# Patient Record
Sex: MALE | Race: WHITE
[De-identification: names, ages, dates, MRNs, and addresses within clinical notes are randomized per-mention and may not be internally consistent; named-entity substitution may affect disease eponyms.]

---

## 2017-02-28 LAB
ANION GAP SERPL CALC-SCNC: 12 MMOL/L (ref 5–19)
BUN SERPL-MCNC: 14 MG/DL (ref 7–20)
CALCIUM: 9.5 MG/DL (ref 8.4–10.2)
CHLORIDE SERPL-SCNC: 99 MMOL/L (ref 98–107)
CO2 SERPL-SCNC: 27 MMOL/L (ref 22–30)
CREAT SERPL-MCNC: 0.99 MG/DL (ref 0.52–1.25)
ERYTHROCYTE [DISTWIDTH] IN BLOOD BY AUTOMATED COUNT: 12.8 % (ref 11.5–14)
GLUCOSE SERPL-MCNC: 284 MG/DL (ref 75–110)
HCT VFR BLD CALC: 43.8 % (ref 37.9–51)
HGB BLD-MCNC: 15.4 G/DL (ref 13.5–17)
HGB HCT DIFFERENCE: 2.4
MCH RBC QN AUTO: 32.2 PG (ref 27–33.4)
MCHC RBC AUTO-ENTMCNC: 35.3 G/DL (ref 32–36)
MCV RBC AUTO: 91 FL (ref 80–97)
POTASSIUM SERPL-SCNC: 5.1 MMOL/L (ref 3.6–5)
RBC # BLD AUTO: 4.8 10^6/UL (ref 4.35–5.55)
SODIUM SERPL-SCNC: 138 MMOL/L (ref 137–145)
WBC # BLD AUTO: 6 10^3/UL (ref 4–10.5)

## 2017-03-07 ENCOUNTER — HOSPITAL ENCOUNTER (OUTPATIENT)
Dept: HOSPITAL 62 - OROUT | Age: 52
Discharge: HOME | End: 2017-03-07
Attending: SURGERY
Payer: COMMERCIAL

## 2017-03-07 VITALS — DIASTOLIC BLOOD PRESSURE: 65 MMHG | SYSTOLIC BLOOD PRESSURE: 115 MMHG

## 2017-03-07 DIAGNOSIS — G47.33: ICD-10-CM

## 2017-03-07 DIAGNOSIS — M19.90: ICD-10-CM

## 2017-03-07 DIAGNOSIS — K42.0: Primary | ICD-10-CM

## 2017-03-07 PROCEDURE — 94640 AIRWAY INHALATION TREATMENT: CPT

## 2017-03-07 PROCEDURE — 85027 COMPLETE CBC AUTOMATED: CPT

## 2017-03-07 PROCEDURE — 82962 GLUCOSE BLOOD TEST: CPT

## 2017-03-07 PROCEDURE — 36415 COLL VENOUS BLD VENIPUNCTURE: CPT

## 2017-03-07 PROCEDURE — 49587: CPT

## 2017-03-07 PROCEDURE — 0WUF0JZ SUPPLEMENT ABDOMINAL WALL WITH SYNTHETIC SUBSTITUTE, OPEN APPROACH: ICD-10-PCS | Performed by: SURGERY

## 2017-03-07 PROCEDURE — 84132 ASSAY OF SERUM POTASSIUM: CPT

## 2017-03-07 PROCEDURE — 71020: CPT

## 2017-03-07 PROCEDURE — 80048 BASIC METABOLIC PNL TOTAL CA: CPT

## 2017-03-07 NOTE — OPERATIVE REPORT
Operative Report


DATE OF SURGERY: 03/07/17


PREOPERATIVE DIAGNOSIS: #1 symptomatic incarcerated umbilical hernia.  #2 

arthritis.


POSTOPERATIVE DIAGNOSIS: #1 symptomatic incarcerated umbilical hernia.  #2 

arthritis.


OPERATION: Reduction and repair with mesh of umbilical hernia.  Open.


SURGEON: LENNOX WILLIAMS 1ST ASSISTANT: ARASH NAPIER


ANESTHESIA: GA


TISSUE REMOVED OR ALTERED: Not applicable.


COMPLICATIONS: 


None


ESTIMATED BLOOD LOSS: 10 mL.


INTRAOPERATIVE FINDINGS: Of a incarcerated umbilical hernia.  Contents being 

preperitoneal fat.  The defect measured about 1.5 cm.  Nicely reduced with a 

satisfactory space in the preperitoneal space.  Reduction well-maintained with 

mesh and the fascial closure.


PROCEDURE: 


After obtaining informed consent and going over the procedure with [the patient 

and his family], he was taken to the operating room, he was anesthetized and 

intubated.  The abdomen was prepped and draped in the usual sterile fashion. 

After the universal timeout, in which it was verified that the patient received 

IV antibiotic, the procedure commenced.





A midline incision was made, curvilinear around the umbilicus about 5 cm in 

length.  This is in reference to the palpation and marking of the abdominal 

wall bulge.  Local anesthesia was infiltrated.  Incision was made with a [15 

blade scalpel].  Dissection now proceeded through the subcutaneous tissue down 

to the hernia sac and umbilical remnant.  The umbilical remnant was transected 

about half a centimeter deep to the skin.  The margins of the hernia were 

defined dissecting with cautery and scissor dissection as needed.  The fascial 

margins were serially grasped with Kocher clamps and elevated.  This 

facilitated [retroperitoneal dissection].  This was done circumferentially for 

at 4 cm, in all directions].  This was facilitated by by moving from each side 

of the table so as to get optimal access to the other.  Hemostasis was now 

secured in this space using monopolar cautery.  





The dimensions of the fascial defect were measured out.  It was enlarged 

somewhat to accommodate the mesh.  A mesh was selected of the size, sufficient 

to cover the hernia defect with at least a 3 cm overlap.  The mesh was now 

deployed flat in the preperitoneal space.  It was anchored and kept slightly 

taut.  In  this way circumferential control of the mesh was accomplished.  The 

wound was irrigated with antibiotic containing solution.  .  Fascial closure,  

was now accomplished using a continuous suture of 0 PDS.  This was done with 

bites 5 mm apart and 5 mm away from the fascial edge.  This conforms to best 

practices for reduction of hernia recurrence.  The subcutaneous tissues under 

it when it were now irrigated with antibiotic containing solution.  The 

subcutaneous tissues were closed using layers of interrupted 3-0 PDS sutures.  

The skin was closed using a continuous suture of 4-0 Monocryl.  This was 

reinforced with Steri-Strips over benzoin and a sterile dressing applied.  





Copies dictated operative report to Dr. Lennox Williams MD

## 2017-03-07 NOTE — PDOC DISCHARGE SUMMARY
Discharge Summary (SDC)





- Discharge


Final Diagnosis: 


#1 incarcerated umbilical hernia symptomatic.





#2 arthritis.


Date of Surgery: 03/07/17


Discharge Date: 03/07/17


Condition: Good


Treatment or Instructions: 


#1 activities within moderation encouraged.  Up to the level of walking.





#2 follow up in my office by appointment in about 1 week.  Call for appointment.





#3 the wounds covered clean and dry until office visit.





#4 hold off on school/work until evaluation in office.





#5 may shower in 48 hours, keep operated area as dry as possible.





#6 discharge from ambulatory  when ASU criteria met.





#7 medications per medication reconciliation sheet.





#8 Percocet and Toradol by prescription.. Also may have one Percocet up to 

every 2 hours  when necessary for pain greater than 4 out of 10 while in the ASU





#9 where abdominal binder as much as possible.  Aim to wear it for at least a 

month.


Prescriptions: 


Ketorolac Tromethamine [Toradol 10 mg Tablet] 10 mg PO Q8HP PRN #0 tablet


 PRN Reason: 


Oxycodone HCl/Acetaminophen [Percocet 5-325 mg Tablet] 1 tab PO ASDIR PRN #15 

tab


 PRN Reason: 


Discharge Diet: As Tolerated


Respiratory Treatments at Home: Deep Breathing/Coughing


Discharge Activity: Other - Up to the level of walking encouraged.


Report the Following to Your Physician Immediately: Increase in Pain, Unusual 

Bleeding

## 2018-01-30 ENCOUNTER — HOSPITAL ENCOUNTER (EMERGENCY)
Dept: HOSPITAL 62 - ER | Age: 53
Discharge: HOME | End: 2018-01-30
Payer: COMMERCIAL

## 2018-01-30 VITALS — SYSTOLIC BLOOD PRESSURE: 144 MMHG | DIASTOLIC BLOOD PRESSURE: 98 MMHG

## 2018-01-30 DIAGNOSIS — R06.02: ICD-10-CM

## 2018-01-30 DIAGNOSIS — R05: ICD-10-CM

## 2018-01-30 DIAGNOSIS — R07.89: Primary | ICD-10-CM

## 2018-01-30 DIAGNOSIS — F17.290: ICD-10-CM

## 2018-01-30 LAB
ADD MANUAL DIFF: NO
ANION GAP SERPL CALC-SCNC: 12 MMOL/L (ref 5–19)
BASOPHILS # BLD AUTO: 0.1 10^3/UL (ref 0–0.2)
BASOPHILS NFR BLD AUTO: 1.1 % (ref 0–2)
BUN SERPL-MCNC: 11 MG/DL (ref 7–20)
CALCIUM: 9.8 MG/DL (ref 8.4–10.2)
CHLORIDE SERPL-SCNC: 104 MMOL/L (ref 98–107)
CO2 SERPL-SCNC: 27 MMOL/L (ref 22–30)
EOSINOPHIL # BLD AUTO: 0.3 10^3/UL (ref 0–0.6)
EOSINOPHIL NFR BLD AUTO: 3.7 % (ref 0–6)
ERYTHROCYTE [DISTWIDTH] IN BLOOD BY AUTOMATED COUNT: 12.5 % (ref 11.5–14)
GLUCOSE SERPL-MCNC: 101 MG/DL (ref 75–110)
HCT VFR BLD CALC: 42.4 % (ref 37.9–51)
HGB BLD-MCNC: 15.3 G/DL (ref 13.5–17)
LYMPHOCYTES # BLD AUTO: 2 10^3/UL (ref 0.5–4.7)
LYMPHOCYTES NFR BLD AUTO: 28.9 % (ref 13–45)
MCH RBC QN AUTO: 33 PG (ref 27–33.4)
MCHC RBC AUTO-ENTMCNC: 36.2 G/DL (ref 32–36)
MCV RBC AUTO: 91 FL (ref 80–97)
MONOCYTES # BLD AUTO: 0.7 10^3/UL (ref 0.1–1.4)
MONOCYTES NFR BLD AUTO: 9.8 % (ref 3–13)
NEUTROPHILS # BLD AUTO: 4 10^3/UL (ref 1.7–8.2)
NEUTS SEG NFR BLD AUTO: 56.5 % (ref 42–78)
PLATELET # BLD: 202 10^3/UL (ref 150–450)
POTASSIUM SERPL-SCNC: 4.2 MMOL/L (ref 3.6–5)
RBC # BLD AUTO: 4.64 10^6/UL (ref 4.35–5.55)
SODIUM SERPL-SCNC: 142.7 MMOL/L (ref 137–145)
TOTAL CELLS COUNTED % (AUTO): 100 %
WBC # BLD AUTO: 7 10^3/UL (ref 4–10.5)

## 2018-01-30 PROCEDURE — 71045 X-RAY EXAM CHEST 1 VIEW: CPT

## 2018-01-30 PROCEDURE — 36415 COLL VENOUS BLD VENIPUNCTURE: CPT

## 2018-01-30 PROCEDURE — 85025 COMPLETE CBC W/AUTO DIFF WBC: CPT

## 2018-01-30 PROCEDURE — 84484 ASSAY OF TROPONIN QUANT: CPT

## 2018-01-30 PROCEDURE — 93005 ELECTROCARDIOGRAM TRACING: CPT

## 2018-01-30 PROCEDURE — 93010 ELECTROCARDIOGRAM REPORT: CPT

## 2018-01-30 PROCEDURE — 99285 EMERGENCY DEPT VISIT HI MDM: CPT

## 2018-01-30 PROCEDURE — 80048 BASIC METABOLIC PNL TOTAL CA: CPT

## 2018-01-30 NOTE — RADIOLOGY REPORT (SQ)
EXAM DESCRIPTION:  CHEST SINGLE VIEW



COMPLETED DATE/TIME:  1/30/2018 8:04 am



REASON FOR STUDY:  CP



COMPARISON:  2/28/2017.



EXAM PARAMETERS:  NUMBER OF VIEWS: One view.

TECHNIQUE: Single frontal radiographic view of the chest acquired.

RADIATION DOSE: NA

LIMITATIONS: None.



FINDINGS:  LUNGS AND PLEURA: No opacities, masses or pneumothorax. No pleural effusion.

MEDIASTINUM AND HILAR STRUCTURES: No masses.  Contour normal.

HEART AND VASCULAR STRUCTURES: Heart normal in size.  Normal vasculature.

BONES: Dorsal spondylosis noted.  .

HARDWARE: None in the chest.

OTHER: Chest leads in place.



IMPRESSION:  NO ACUTE DISEASE.



TECHNICAL DOCUMENTATION:  JOB ID:  [id number]

[id number]

 2011 [employer]- All Rights Reserved

## 2018-01-30 NOTE — ER DOCUMENT REPORT
ED General





- General


Chief Complaint: Chest Pain


Stated Complaint: LEFT SIDE PAIN


Time Seen by Provider: 01/30/18 07:35


Notes: 





52-year-old male presents with chest pain right-sided supraclavicular, this 

began yesterday in the setting of coughing congestion and body aches.  He was 

diagnosed with fluid given Robitussin and "an antibiotic."  Yesterday.  He 

developed pain on the right supraclavicular region after coughing which was 

worse with deep breath and in certain positions.  In the middle the night he 

woke up and not pain moves across his chest over his sternum to his left side 

down his arm.  This lasted about 30 minutes and resolve spontaneously.  He has 

never had this before.  It is not exertional.  He denies sweating or shortness 

of breath.  He has no history of cardiac disease.  He does vapor tobacco.


TRAVEL OUTSIDE OF THE [place] IN LAST 30 DAYS: No





- Related Data


Allergies/Adverse Reactions: 


 





No Known Allergies Allergy (Verified 01/30/18 08:55)


 











Past Medical History





- General


Information source: Patient





- Social History


Smoking Status: Current Every Day Smoker


Smoking Education Provided: Yes - The patient ED visit today was directly 

related to their abuse of tobacco. 


Family History: Reviewed & Not Pertinent





- Past Medical History


Cardiac Medical History: 


   Denies: Hx Coronary Artery Disease, Hx Heart Attack, Hx Hypertension


Pulmonary Medical History: Reports: Hx Asthma - AS A CHILD-no meds


   Denies: Hx Bronchitis, Hx COPD, Hx Pneumonia


Neurological Medical History: Denies: Hx Cerebrovascular Accident, Hx Seizures


Musculoskeltal Medical History: Reports Hx Arthritis - hands,ankles


Past Surgical History: Reports: Hx Cholecystectomy, Hx Tonsillectomy





- Immunizations


Hx Diphtheria, Pertussis, Tetanus Vaccination: Yes





Review of Systems





- Review of Systems


Notes: 





REVIEW OF SYSTEMS





GEN: Denies fever, chills, weight loss


ENT: Denies sore throat, nasal discharge, ear pain


EYES: Denies blurry vision, eye pain, discharge


CV: Resolved chest pain


RESP: D cough and shortness of breath


GI: Denies abdominal pain, nausea, vomiting, diarrhea


MSK: Denies joint pain/swelling, edema, 


SKIN: Denies rash, skin lesions


LYMPH: Denies swollen glands/lymph nodes


NEURO: Denies headache, focal weakness or numbness, dizziness


PSYCH: Denies depression, suicidal or homicidal ideation








PHYSICAL EXAMINATION





General: No acute distress, well-nourished


Head: Atraumatic, normocephalic


ENT: Mouth normal, oropharynx moist, no exudates or tonsillar enlargement


Eyes: Conjunctiva normal, pupils equal, lids normal


Neck: No JVD, supple, no guarding


CVS: Normal rate, regular rhythm, no murmurs.  Right chest tenderness below the 

clavicle


Resp: No resp distress, equal and normal breath sounds bilaterally


GI: Nondistended, soft, no tenderness to palpation, no rebound or guarding


Ext: No deformities, no edema, normal range of motion in upper and lower ext


Back: No CVA or midline TTP


Skin: No rash, warm


Lymphatic: No lymphadeopathy noted


Neuro: Awake, alert.  Face symmetric.  GCS 15.





Physical Exam





- Vital signs


Vitals: 


 











Temp Pulse Resp BP Pulse Ox


 


 97.8 F   67   16   152/89 H  96 


 


 01/30/18 07:36  01/30/18 07:36  01/30/18 07:36  01/30/18 07:36  01/30/18 07:36














Course





- Re-evaluation


Re-evalutation: 





01/30/18 07:58


This is a 52-year-old male with no cardiac disease history presenting with 

right chest pain radiating across to his left arm now resolved all in the 

setting of body aches and respiratory symptoms.  This likely reflects pleurisy/

chest wall irritation from coughing rather than acute coronary syndrome given 

his story.


He does use tobacco is a risk factor for coronary disease.  His ECG is very 

normal on my examination.  He is chest pain is reproducible.  Despite that I 

will get a single troponin to rule him out, chest x-ray to rule out pneumonia 

and basic laboratory testing.  He is already on antitussives and antibiotics.


01/30/18 09:42


GI negative, EKG and troponin negative.  Likely pleurisy, no evidence of 

pulmonary illness.


Discharge is stable condition to follow-up with primary care.


I have discussed with the patient there likely diagnosis, aftercare plan, follow

-up plans and my usual and customary return precautions.  They verbalized 

understanding of this.





- Vital Signs


Vital signs: 


 











Temp Pulse Resp BP Pulse Ox


 


 97.8 F   67   17   144/98 H  98 


 


 01/30/18 07:36  01/30/18 07:36  01/30/18 09:01  01/30/18 09:01  01/30/18 09:01














- Laboratory


Result Diagrams: 


 01/30/18 08:25





 01/30/18 08:25


Laboratory results interpreted by me: 


 











  01/30/18





  08:25


 


[facility]  36.2 H














Discharge





- Discharge


Clinical Impression: 


 Chest wall pain





Condition: Good


Disposition: HOME, SELF-CARE


Instructions:  Chest Pain of Unclear Cause ([facility])


Additional Instructions: 


Please continue taking the 2 medications you have been prescribed for your 

respiratory infection.  Please see your primary care doctor in 2-3 days for a 

follow-up.  If your pain gets worse you get any nausea with the pain or 

shortness of breath with the pain or are otherwise concerned, please return to 

the emergency room for further evaluation.


Referrals: 


MELANI TEJEDA MD [Primary Care Provider] - Follow up as needed

## 2018-07-29 ENCOUNTER — HOSPITAL ENCOUNTER (EMERGENCY)
Dept: HOSPITAL 62 - ER | Age: 53
Discharge: HOME | End: 2018-07-29
Payer: COMMERCIAL

## 2018-07-29 VITALS — DIASTOLIC BLOOD PRESSURE: 83 MMHG | SYSTOLIC BLOOD PRESSURE: 138 MMHG

## 2018-07-29 DIAGNOSIS — E11.9: ICD-10-CM
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
## 2018-07-29 DIAGNOSIS — F07.81: ICD-10-CM

## 2018-07-29 DIAGNOSIS — S09.90XA: Primary | ICD-10-CM

## 2018-07-29 DIAGNOSIS — J45.909: ICD-10-CM

## 2018-07-29 LAB
ADD MANUAL DIFF: NO
ALBUMIN SERPL-MCNC: 3.2 G/DL (ref 3.5–5)
ALP SERPL-CCNC: 90 U/L (ref 38–126)
ALT SERPL-CCNC: 48 U/L (ref 21–72)
ANION GAP SERPL CALC-SCNC: 13 MMOL/L (ref 5–19)
AST SERPL-CCNC: 59 U/L (ref 17–59)
BASOPHILS # BLD AUTO: 0.1 10^3/UL (ref 0–0.2)
BASOPHILS NFR BLD AUTO: 0.9 % (ref 0–2)
BILIRUB DIRECT SERPL-MCNC: 0.3 MG/DL (ref 0–0.4)
BILIRUB SERPL-MCNC: 1.3 MG/DL (ref 0.2–1.3)
BUN SERPL-MCNC: 15 MG/DL (ref 7–20)
CALCIUM: 8.6 MG/DL (ref 8.4–10.2)
CHLORIDE SERPL-SCNC: 105 MMOL/L (ref 98–107)
CO2 SERPL-SCNC: 26 MMOL/L (ref 22–30)
EOSINOPHIL # BLD AUTO: 0.4 10^3/UL (ref 0–0.6)
EOSINOPHIL NFR BLD AUTO: 5.5 % (ref 0–6)
ERYTHROCYTE [DISTWIDTH] IN BLOOD BY AUTOMATED COUNT: 12.8 % (ref 11.5–14)
GLUCOSE SERPL-MCNC: 156 MG/DL (ref 75–110)
HCT VFR BLD CALC: 24 % (ref 37.9–51)
HGB BLD-MCNC: 8.5 G/DL (ref 13.5–17)
LYMPHOCYTES # BLD AUTO: 1.7 10^3/UL (ref 0.5–4.7)
LYMPHOCYTES NFR BLD AUTO: 22.3 % (ref 13–45)
MCH RBC QN AUTO: 32.7 PG (ref 27–33.4)
MCHC RBC AUTO-ENTMCNC: 35.2 G/DL (ref 32–36)
MCV RBC AUTO: 93 FL (ref 80–97)
MONOCYTES # BLD AUTO: 0.7 10^3/UL (ref 0.1–1.4)
MONOCYTES NFR BLD AUTO: 9.3 % (ref 3–13)
NEUTROPHILS # BLD AUTO: 4.8 10^3/UL (ref 1.7–8.2)
NEUTS SEG NFR BLD AUTO: 62 % (ref 42–78)
PLATELET # BLD: 356 10^3/UL (ref 150–450)
POTASSIUM SERPL-SCNC: 3.9 MMOL/L (ref 3.6–5)
PROT SERPL-MCNC: 5.9 G/DL (ref 6.3–8.2)
RBC # BLD AUTO: 2.59 10^6/UL (ref 4.35–5.55)
SODIUM SERPL-SCNC: 143.7 MMOL/L (ref 137–145)
TOTAL CELLS COUNTED % (AUTO): 100 %
WBC # BLD AUTO: 7.8 10^3/UL (ref 4–10.5)

## 2018-07-29 PROCEDURE — 36415 COLL VENOUS BLD VENIPUNCTURE: CPT

## 2018-07-29 PROCEDURE — 99284 EMERGENCY DEPT VISIT MOD MDM: CPT

## 2018-07-29 PROCEDURE — 70450 CT HEAD/BRAIN W/O DYE: CPT

## 2018-07-29 PROCEDURE — 80053 COMPREHEN METABOLIC PANEL: CPT

## 2018-07-29 PROCEDURE — 85025 COMPLETE CBC W/AUTO DIFF WBC: CPT

## 2018-07-29 PROCEDURE — 70551 MRI BRAIN STEM W/O DYE: CPT
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
## 2018-07-29 NOTE — RADIOLOGY REPORT (SQ)
EXAM DESCRIPTION:  CT HEAD WITHOUT



COMPLETED DATE/TIME:  7/29/2018 4:27 pm



REASON FOR STUDY:  dysarthria



COMPARISON:  None.



TECHNIQUE:  Axial images acquired through the brain without intravenous contrast.  Images reviewed wi
th bone, brain and subdural windows.  Additional sagittal and coronal reconstructions were generated.
 Images stored on PACS.

All CT scanners at this facility use dose modulation, iterative reconstruction, and/or weight based d
osing when appropriate to reduce radiation dose to as low as reasonably achievable (ALARA).

[facility]: Dose Right  [facility]: CareDose    [facility]: Dose Right    [facility]: Teradose 4D    [facility]: Smart [employer] Software



RADIATION DOSE:  CT Rad equipment meets quality standard of care and radiation dose reduction techniq
ues were employed. CTDIvol: 53.2 mGy. DLP: 991 mGy-cm. mGy.



LIMITATIONS:  None.



FINDINGS:  VENTRICLES: Normal size and contour.

CEREBRUM: No masses.  No hemorrhage.  No midline shift.  No evidence for acute infarction. Normal gra
y/white matter differentiation. No areas of low density in the white matter.

CEREBELLUM: No masses.  No hemorrhage.  No alteration of density.  No evidence for acute infarction.

EXTRAAXIAL SPACES: No fluid collections.  No masses.

ORBITS AND GLOBE: No intra- or extraconal masses.  Normal contour of globe without masses.

CALVARIUM: No fracture.

PARANASAL SINUSES: No fluid or mucosal thickening.

SOFT TISSUES: No mass or hematoma.

OTHER: No other significant finding.



IMPRESSION:  NORMAL BRAIN CT WITHOUT CONTRAST.

EVIDENCE OF ACUTE STROKE: NO.



COMMENT:  Quality ID # 436: Final reports with documentation of one or more dose reduction techniques
 (e.g., Automated exposure control, adjustment of the mA and/or kV according to patient size, use of 
iterative reconstruction technique)



TECHNICAL DOCUMENTATION:  JOB ID:  [id number]

 2011 [employer]- All Rights Reserved



Reading location - IP/workstation name: [email or web address]
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
## 2020-10-22 ENCOUNTER — HOSPITAL ENCOUNTER (EMERGENCY)
Dept: HOSPITAL 62 - ER | Age: 55
Discharge: HOME | End: 2020-10-22
Payer: COMMERCIAL

## 2020-10-22 VITALS — SYSTOLIC BLOOD PRESSURE: 147 MMHG | DIASTOLIC BLOOD PRESSURE: 91 MMHG

## 2020-10-22 DIAGNOSIS — L03.113: ICD-10-CM

## 2020-10-22 DIAGNOSIS — E11.69: ICD-10-CM

## 2020-10-22 DIAGNOSIS — Z20.3: ICD-10-CM
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
## 2020-10-22 DIAGNOSIS — Z23: ICD-10-CM

## 2020-10-22 DIAGNOSIS — S61.451A: Primary | ICD-10-CM

## 2020-10-22 LAB
ADD MANUAL DIFF: NO
ALBUMIN SERPL-MCNC: 4.6 G/DL (ref 3.5–5)
ALP SERPL-CCNC: 76 U/L (ref 38–126)
ANION GAP SERPL CALC-SCNC: 11 MMOL/L (ref 5–19)
AST SERPL-CCNC: 40 U/L (ref 17–59)
BASOPHILS # BLD AUTO: 0.1 10^3/UL (ref 0–0.2)
BASOPHILS NFR BLD AUTO: 0.8 % (ref 0–2)
BILIRUB DIRECT SERPL-MCNC: 0.2 MG/DL (ref 0–0.4)
BILIRUB SERPL-MCNC: 0.7 MG/DL (ref 0.2–1.3)
BUN SERPL-MCNC: 12 MG/DL (ref 7–20)
CALCIUM: 9.6 MG/DL (ref 8.4–10.2)
CHLORIDE SERPL-SCNC: 101 MMOL/L (ref 98–107)
CO2 SERPL-SCNC: 26 MMOL/L (ref 22–30)
EOSINOPHIL # BLD AUTO: 0.2 10^3/UL (ref 0–0.6)
EOSINOPHIL NFR BLD AUTO: 2.5 % (ref 0–6)
ERYTHROCYTE [DISTWIDTH] IN BLOOD BY AUTOMATED COUNT: 12.3 % (ref 11.5–14)
GLUCOSE SERPL-MCNC: 286 MG/DL (ref 75–110)
HCT VFR BLD CALC: 43 % (ref 37.9–51)
HGB BLD-MCNC: 15.5 G/DL (ref 13.5–17)
LYMPHOCYTES # BLD AUTO: 2 10^3/UL (ref 0.5–4.7)
LYMPHOCYTES NFR BLD AUTO: 22.2 % (ref 13–45)
MCH RBC QN AUTO: 33.1 PG (ref 27–33.4)
MCHC RBC AUTO-ENTMCNC: 36.2 G/DL (ref 32–36)
MCV RBC AUTO: 92 FL (ref 80–97)
MONOCYTES # BLD AUTO: 0.6 10^3/UL (ref 0.1–1.4)
MONOCYTES NFR BLD AUTO: 6.6 % (ref 3–13)
NEUTROPHILS # BLD AUTO: 6 10^3/UL (ref 1.7–8.2)
NEUTS SEG NFR BLD AUTO: 67.9 % (ref 42–78)
PLATELET # BLD: 204 10^3/UL (ref 150–450)
POTASSIUM SERPL-SCNC: 4 MMOL/L (ref 3.6–5)
PROT SERPL-MCNC: 7.4 G/DL (ref 6.3–8.2)
RBC # BLD AUTO: 4.69 10^6/UL (ref 4.35–5.55)
TOTAL CELLS COUNTED % (AUTO): 100 %
WBC # BLD AUTO: 8.9 10^3/UL (ref 4–10.5)

## 2020-10-22 PROCEDURE — 90375 RABIES IG IM/SC: CPT

## 2020-10-22 PROCEDURE — 90471 IMMUNIZATION ADMIN: CPT

## 2020-10-22 PROCEDURE — 36415 COLL VENOUS BLD VENIPUNCTURE: CPT

## 2020-10-22 PROCEDURE — 90675 RABIES VACCINE IM: CPT

## 2020-10-22 PROCEDURE — 85025 COMPLETE CBC W/AUTO DIFF WBC: CPT

## 2020-10-22 PROCEDURE — 90472 IMMUNIZATION ADMIN EACH ADD: CPT

## 2020-10-22 PROCEDURE — 80053 COMPREHEN METABOLIC PANEL: CPT

## 2020-10-22 PROCEDURE — 96372 THER/PROPH/DIAG INJ SC/IM: CPT

## 2020-10-22 PROCEDURE — 99284 EMERGENCY DEPT VISIT MOD MDM: CPT

## 2020-10-22 PROCEDURE — 87040 BLOOD CULTURE FOR BACTERIA: CPT

## 2020-10-22 PROCEDURE — 90715 TDAP VACCINE 7 YRS/> IM: CPT

## 2020-10-22 PROCEDURE — 73130 X-RAY EXAM OF HAND: CPT

## 2020-10-22 NOTE — ER DOCUMENT REPORT
Entered by MARICRUZ BRUNER SCRIBE  10/22/20 2049 





Acting as scribe for:RICH GONZALEZ DO





ED Animal Bite





- General


Chief Complaint: Animal Bite


Stated Complaint: CAT BITE/RIGHT HAND


Time Seen by Provider: 10/22/20 16:54


Primary Care Provider: 


MELANI TEJEDA MD [Primary Care Provider] - Follow up as needed


Mode of Arrival: Ambulatory


Information source: Patient


Notes: 





This 55 year old male patient with DM type 2, presents to the emergency de

partment today with a cat bite. Patient states his daughter brought a stray cat 

home that she found in the [employer] parking lot yesterday around 7 pm. Patient 

states the cat would not get out of his car and when he went to move the cat, it

turned around and bit his right hand. Patient states he cleaned his hand with 

peroxide and iodine, but woke this morning with right hand pain. Patient states 

the pain moved up his RUE and there is pain with movement of his right hand. 

Patient states he does not remember his last tetanus update. 


TRAVEL OUTSIDE OF THE [place] IN LAST 30 DAYS: No





- Related Data


Allergies/Adverse Reactions: 


                                        





No Known Allergies Allergy (Verified 10/22/20 16:34)


   











Past Medical History





- General


Information source: Patient





- Social History


Smoking Status: Never Smoker


Cigarette use (# per day): No


Chew tobacco use (# tins/day): No


Drug Abuse: None


Lives with: Family


Family History: Reviewed & Not Pertinent


Patient has homicidal ideation: No


Pulmonary Medical History: Reports: Hx Asthma - AS A CHILD-no meds


Endocrine Medical History: Reports: Hx Diabetes Mellitus Type 2


Musculoskeletal Medical History: Reports Hx Arthritis - hands,ankles


Past Surgical History: Reports: Hx Abdominal Surgery, Hx Cholecystectomy, Hx 

Orthopedic Surgery, Hx Tonsillectomy





- Immunizations


Hx Diphtheria, Pertussis, Tetanus Vaccination: Yes





Review of Systems





- Review of Systems


Constitutional: No symptoms reported


EENT: No symptoms reported


Cardiovascular: No symptoms reported


Respiratory: No symptoms reported


Gastrointestinal: No symptoms reported


Genitourinary: No symptoms reported


Male Genitourinary: No symptoms reported


Musculoskeletal: See HPI, Other - RUE pain


Skin: See HPI, Other - bite from cat


Hematologic/Lymphatic: No symptoms reported


Neurological/Psychological: No symptoms reported


-: Yes All other systems reviewed and negative





Physical Exam





- Vital signs


Vitals: 


                                        











Temp Pulse Resp BP Pulse Ox


 


 98.1 F   95   16   139/86 H  96 


 


 10/22/20 16:26  10/22/20 16:26  10/22/20 16:26  10/22/20 16:26  10/22/20 16:26














- General


General appearance: Appears well, Alert





- HEENT


Head: Normocephalic, Atraumatic


Eyes: Normal


Pupils: PERRL





- Respiratory


Respiratory status: No respiratory distress


Chest status: Nontender


Breath sounds: Normal


Chest palpation: Normal





- Cardiovascular


Rhythm: Regular


Heart sounds: Normal auscultation


Murmur: No





- Abdominal


Inspection: Obese


Distension: No distension


Bowel sounds: Normal


Tenderness: Nontender





- Extremities


General lower extremity: Normal inspection, Normal ROM.  No: Edema


Notes: 


Soft tissue swelling to the dorsal aspect of the right hand.


Erythema from the right metacarpophalangeal joint to the wrist. 


Normal ROM of the right wrist and elbow. 





- Neurological


Neuro grossly intact: Yes


Cognition: Normal


Orientation: AAOx4


Schoenchen Coma Scale Eye Opening: Spontaneous


Schoenchen Coma Scale Verbal: Oriented


Schoenchen Coma Scale Motor: Obeys Commands


Allie Coma Scale Total: 15


Speech: Normal


Sensory: Normal





- Psychological


Associated symptoms: Normal affect, Normal mood





- Skin


Skin Temperature: Warm


Skin Moisture: Dry


Skin Color: Normal





Course





- Re-evaluation


Re-evalutation: 





10/22/20 21:34


MDM  55 year old male arrives with right hand pain and sts.  Right lateral hand 

sts is mild with some erythema present. There is pain with rom of the right hand

but the fifth digit can be flexed and extended.  Pain radiates up right arm.  

Last tetanus was decades ago.  Also the bite was from an unknown cat - stray let

into vehicle by daughter.  He has DM and ate on the way here he tells me.  We 

discussed admission for this cellulitis vs close follow up - tomorrow - and he 

tells me his wife is a medical assistant and he would like to follow up closely.

 He will follow up tomorrow.   His elevated blood sugar has been adressed here 

agressively and blood cultures obtained and parenteral antibiotics administered.

 Additionally he has return precautions. Also he is texting on his phone when I 

enter the room with both hands demonstrating ability of right hand to function 

adequately even though discomfort exists.  











- Vital Signs


Vital signs: 


                                        











Temp Pulse Resp BP Pulse Ox


 


 98.1 F   95   16   139/86 H  96 


 


 10/22/20 16:26  10/22/20 16:26  10/22/20 16:26  10/22/20 16:26  10/22/20 16:26














- Laboratory


Result Diagrams: 


                                 10/22/20 17:32





                                 10/22/20 17:32


Laboratory results interpreted by me: 


                                        











  10/22/20 10/22/20





  17:32 17:32


 


MCHC  36.2 H 


 


Glucose   286 H


 


ALT   68 H














- Diagnostic Test


Radiology reviewed: Image reviewed, Reports reviewed





Discharge





- Discharge


Clinical Impression: 


Cellulitis


Qualifiers:


 Site of cellulitis: extremity Site of cellulitis of extremity: upper extremity 

Laterality: right Qualified Code(s): L03.113 - Cellulitis of right upper limb





Diabetes mellitus


Qualifiers:


 Diabetes mellitus type: type 2 Diabetes mellitus long term insulin use: without

long term use Diabetes mellitus complication status: with other specified 

complication Qualified Code(s): E11.69 - Type 2 diabetes mellitus with other 

specified complication





Condition: Stable


Disposition: HOME, SELF-CARE


Instructions:  Cellulitis ([facility]), Diabetes ([facility]), Control of Diabetes During Illn

ess ([facility])


Additional Instructions: 


Keep your blood sugar under good control - less than 150.  Elevate your right 

arm. 





See your doctor or the referral doctor in follow up tomorrow - 10/23/20





Return here immediately for increased pain, redness extending past the wrist, 

fever, or any other problems or concerns.





Finish the antibiotic.





Animal Control should be contacting you tomorrow regarding follow up rabies 

shots.





Your antibiotic has been sent to the pharmacy.  Take 2 doses tomorrow. 


Referrals: 


JC FLEMING JR, DO [ACTIVE PROVISIONAL STAFF] - 10/23/20


MELANI TEJEDA MD [Primary Care Provider] - 10/23/20





I personally performed the services described in the documentation, reviewed and

edited the documentation which was dictated to the scribe in my presence, and it

accurately records my words and actions.

## 2020-10-22 NOTE — ER DOCUMENT REPORT
ED Medical Screen (RME)





- General


Chief Complaint: Animal Bite


Stated Complaint: CAT BITE/RIGHT HAND


Time Seen by Provider: 10/22/20 16:54


Primary Care Provider: 


MELANI TEJEDA MD [Primary Care Provider] - Follow up as needed


Mode of Arrival: Ambulatory


Information source: Patient


Notes: 





55-year-old male patient presents emergency department concern for a cat bite 

that occurred approximately 24 hours ago.  Patient reports he was bitten on the 

right hand near the base of the fifth digit by a stray cat.  He states he was at

[facility], his 17-year-old daughter was waiting in the car for him when she 

decided to grab a stray cat and tried to bring it home with him.  He reports the

cat was acting normal with her however when he got to the car the cat appeared 

scared, did not want to get out of the car, he swatted to get the cat out and 

the cat bit him.  His tetanus is not up-to-date.  He states he did not seek 

medical treatment until today because he started having pain extending from the 

area where the cat bit him up into his wrist then elbow and ultimately up into 

his shoulder.





There are 2 puncture wounds noted to the right fifth digit near the base.  I do 

not appreciate any significant erythema, no streaking from the area.  There is 

swelling he has limited range of motion., 





I have greeted and performed a rapid initial assessment of this patient.  A 

comprehensive ED assessment and evaluation of the patient, analysis of test 

results and completion of the medical decision making process will be conducted 

by additional ED providers.  I have specifically instructed the patient or 

family members with the patient to immediately return to any nursing staff 

should anything change in the patient's condition or with their chief complaint.





TRAVEL OUTSIDE OF THE [place] IN LAST 30 DAYS: No





- Related Data


Allergies/Adverse Reactions: 


                                        





No Known Allergies Allergy (Verified 10/22/20 16:34)


   











Past Medical History





- Social History


Chew tobacco use (# tins/day): No


Drug Abuse: None





- Past Medical History


Cardiac Medical History: 


   Denies: Hx Coronary Artery Disease, Hx Heart Attack, Hx Hypertension


Pulmonary Medical History: Reports: Hx Asthma - AS A CHILD-no meds


   Denies: Hx Bronchitis, Hx COPD, Hx Pneumonia


Neurological Medical History: Denies: Hx Cerebrovascular Accident, Hx Seizures


Endocrine Medical History: Reports: Hx Diabetes Mellitus Type 2


Renal/ Medical History: Denies: Hx Peritoneal Dialysis


Musculoskeltal Medical History: Reports Hx Arthritis - hands,ankles


Past Surgical History: Reports: Hx Abdominal Surgery, Hx Cholecystectomy, Hx 

Orthopedic Surgery, Hx Tonsillectomy





- Immunizations


Hx Diphtheria, Pertussis, Tetanus Vaccination: Yes





Physical Exam





- Vital signs


Vitals: 


                                        











Temp Pulse Resp BP Pulse Ox


 


 98.1 F   95   16   139/86 H  96 


 


 10/22/20 16:26  10/22/20 16:26  10/22/20 16:26  10/22/20 16:26  10/22/20 16:26














Course





- Vital Signs


Vital signs: 


                                        











Temp Pulse Resp BP Pulse Ox


 


 98.1 F   95   16   139/86 H  96 


 


 10/22/20 16:26  10/22/20 16:26  10/22/20 16:26  10/22/20 16:26  10/22/20 16:26














Doctor's Discharge





- Discharge


Referrals: 


MELANI TEJEDA MD [Primary Care Provider] - Follow up as needed

## 2020-10-22 NOTE — RADIOLOGY REPORT (SQ)
EXAM DESCRIPTION:  HAND RIGHT 3 VIEWS



IMAGES COMPLETED DATE/TIME:  10/22/2020 5:15 pm



REASON FOR STUDY:  cat bite near base of 5th digit, eval for FB



COMPARISON:  None.



EXAM PARAMETERS:  NUMBER OF VIEWS: Three views.

TECHNIQUE: AP, lateral and oblique  radiographic images acquired of the right hand.

LIMITATIONS: None.



FINDINGS:  MINERALIZATION: Normal.

BONES: No acute fracture or dislocation.  No worrisome bone lesions.

JOINTS: No effusions.

SOFT TISSUES: No soft tissue swelling.  No foreign body.

OTHER: No other significant finding.



IMPRESSION:  NEGATIVE STUDY OF THE RIGHT HAND. NO RADIOGRAPHIC EVIDENCE OF ACUTE INJURY.



TECHNICAL DOCUMENTATION:  JOB ID:  [id number]

 2011 [employer]- All Rights Reserved



Reading location - IP/workstation name: [email or web address]